# Patient Record
Sex: MALE | Race: WHITE | NOT HISPANIC OR LATINO | Employment: FULL TIME | URBAN - METROPOLITAN AREA
[De-identification: names, ages, dates, MRNs, and addresses within clinical notes are randomized per-mention and may not be internally consistent; named-entity substitution may affect disease eponyms.]

---

## 2018-10-29 ENCOUNTER — OFFICE VISIT (OUTPATIENT)
Dept: SURGERY | Facility: CLINIC | Age: 18
End: 2018-10-29

## 2018-10-29 VITALS — HEART RATE: 73 BPM | OXYGEN SATURATION: 98 % | BODY MASS INDEX: 21.8 KG/M2 | HEIGHT: 69 IN | WEIGHT: 147.2 LBS

## 2018-10-29 DIAGNOSIS — R07.89 ANTERIOR CHEST WALL PAIN: Primary | ICD-10-CM

## 2018-10-29 PROCEDURE — 99244 OFF/OP CNSLTJ NEW/EST MOD 40: CPT | Performed by: SURGERY

## 2018-10-29 RX ORDER — DEXTROAMPHETAMINE SACCHARATE, AMPHETAMINE ASPARTATE, DEXTROAMPHETAMINE SULFATE AND AMPHETAMINE SULFATE 1.25; 1.25; 1.25; 1.25 MG/1; MG/1; MG/1; MG/1
1 TABLET ORAL DAILY
COMMUNITY
Start: 2018-10-09

## 2018-10-29 RX ORDER — METHOCARBAMOL 500 MG/1
1 TABLET, FILM COATED ORAL NIGHTLY
COMMUNITY
Start: 2018-10-26

## 2018-10-29 RX ORDER — IBUPROFEN 800 MG/1
1 TABLET ORAL NIGHTLY
COMMUNITY
Start: 2018-10-26

## 2018-10-29 RX ORDER — DEXTROAMPHETAMINE SACCHARATE, AMPHETAMINE ASPARTATE MONOHYDRATE, DEXTROAMPHETAMINE SULFATE AND AMPHETAMINE SULFATE 5; 5; 5; 5 MG/1; MG/1; MG/1; MG/1
1 CAPSULE, EXTENDED RELEASE ORAL DAILY
COMMUNITY
Start: 2018-10-09

## 2018-10-29 RX ORDER — FLUOXETINE 10 MG/1
10 CAPSULE ORAL DAILY
COMMUNITY

## 2018-10-29 RX ORDER — TRAMADOL HYDROCHLORIDE 50 MG/1
1 TABLET ORAL NIGHTLY
COMMUNITY
Start: 2018-10-26

## 2018-10-29 NOTE — PROGRESS NOTES
SUMMARY (A/P):    18-year-old gentleman with right chest wall pain at the clavicular sternal junction of uncertain etiology without definitive diagnostic findings on exam.  I have requested and I'm waiting for the results of his CT scan.  He also has an MRI scheduled for today.  I will review these when they become available and further recommendations will follow.      CC:    Anterior chest wall pain, referred for consultation by Dr. Godoy    HPI:    18-year-old gentleman with 3-4 week history of moderately severe intermittent pain at the right clavicular sternal junction.  His pain improved a little with use of tramadol.  There are no other associated symptoms such as fever or chills.  He has not had this type of pain before.    PSH:    None    PMH:    ADD (on Adderall)    FAMILY HISTORY:    Negative for colorectal cancer    SOCIAL HISTORY:   Denies tobacco use  Denies alcohol use    ALLERGIES: reviewed, in Epic    MEDICATIONS: reviewed, in Epic    ROS:  No chest pain or shortness of air.  All other systems reviewed and negative other than presenting complaints.    PHYSICAL EXAM:   Constitutional: Well-developed well-nourished, no acute distress  Vital signs: Heart rate 73, weight 147 pounds, height 69 inches, BMI 21.7  Eyes: Conjunctiva normal, sclera nonicteric  ENMT: Hearing grossly normal, oral mucosa moist  Neck: Supple, no palpable mass, normal thyroid, trachea midline  Respiratory: Clear to auscultation, normal inspiratory effort  Cardiovascular: Regular rate, no murmur, no carotid bruit, no peripheral edema, no jugular venous distention  Gastrointestinal: Soft, nontender, no palpable mass, no hepatosplenomegaly, negative for hernia, bowel sounds normal  Lymphatics (palpable nodes):  cervical-negative, axillary-negative, inguinal-negative  Skin:  Warm, dry.  He is mildly to moderately tender to palpation over the right clavicular sternal junction region.  There does appear to be some soft tissue  "\"fullness\", but I cannot palpate a discrete mass.  There is no erythema, no crepitance, no fluctuance.  Musculoskeletal: Symmetric strength, normal gait  Psychiatric: Alert and oriented ×3, normal affect     LORRAINE JAIN M.D.      "

## 2018-10-31 ENCOUNTER — TRANSCRIBE ORDERS (OUTPATIENT)
Dept: SURGERY | Facility: CLINIC | Age: 18
End: 2018-10-31

## 2018-10-31 ENCOUNTER — TELEPHONE (OUTPATIENT)
Dept: SURGERY | Facility: CLINIC | Age: 18
End: 2018-10-31

## 2018-10-31 DIAGNOSIS — IMO0002 MASS: Primary | ICD-10-CM

## 2018-10-31 DIAGNOSIS — M89.8X8 STERNAL MASS: ICD-10-CM

## 2018-10-31 RX ORDER — AMOXICILLIN AND CLAVULANATE POTASSIUM 875; 125 MG/1; MG/1
1 TABLET, FILM COATED ORAL 2 TIMES DAILY
Qty: 20 TABLET | Refills: 0 | Status: SHIPPED | OUTPATIENT
Start: 2018-10-31 | End: 2018-11-10

## 2018-10-31 NOTE — TELEPHONE ENCOUNTER
I spoke w/ the patient's mother informed her of Dr. Garrido's response. RX called into pharmacy, referral entered also.

## 2018-10-31 NOTE — TELEPHONE ENCOUNTER
I sent the following message (I believed to Sera):    Please let him know that his MRI shows that this appears to be a problem in the clavicular sternal joint space.  It may be infectious but this is not clear from the MRI.  Please:  1.  Call in prescription for Augmentin 875 mg by mouth twice a day, #20  2.  Have him see one of the orthopedic surgeons (either [facility] or [facility])    So yes, I reviewed the images and those are my recommendations.

## 2018-10-31 NOTE — TELEPHONE ENCOUNTER
Per Rima, referral coordinator with [facility], this referral should go to thoracic surgery. Please advise.

## 2018-11-07 ENCOUNTER — OFFICE VISIT (OUTPATIENT)
Dept: ORTHOPEDIC SURGERY | Facility: CLINIC | Age: 18
End: 2018-11-07

## 2018-11-07 VITALS — WEIGHT: 146 LBS | BODY MASS INDEX: 21.62 KG/M2 | TEMPERATURE: 100 F | HEIGHT: 69 IN

## 2018-11-07 DIAGNOSIS — M25.811 MASS OF JOINT OF RIGHT SHOULDER: Primary | ICD-10-CM

## 2018-11-07 PROCEDURE — 20605 DRAIN/INJ JOINT/BURSA W/O US: CPT | Performed by: ORTHOPAEDIC SURGERY

## 2018-11-07 PROCEDURE — 99203 OFFICE O/P NEW LOW 30 MIN: CPT | Performed by: ORTHOPAEDIC SURGERY

## 2018-11-07 RX ORDER — HYDROCODONE BITARTRATE AND ACETAMINOPHEN 5; 325 MG/1; MG/1
1 TABLET ORAL EVERY 4 HOURS PRN
Qty: 20 TABLET | Refills: 0 | Status: SHIPPED | OUTPATIENT
Start: 2018-11-07

## 2018-11-07 NOTE — PROGRESS NOTES
"  Patient: González Altman    YOB: 2000    Medical Record Number: [id number]    Chief Complaints:  Painful right shoulder mass    History of Present Illness:     18 y.o. male patient who presents for evaluation of his right shoulder.  He tells me that he first started to have symptoms little over a month ago.  He does not recall any specific inciting event or factor.  The pain came on suddenly overnight.  He describes his current pain as mild to moderate, constant and aching.  The pain is worse with shoulder movement.  He has noticed some associated swelling in the front of his chest on the right side.  He tells me that the swelling \"comes and goes\".  He has pain when sleeping on his right side or trying to reach or lift.  Localizes the pain to the sternoclavicular joint.  Denies any fevers, chills, sweats or other constitutional symptoms.  Denies any significant medical history.  Denies any history of IV drug use.  He is active and enjoys working out.    Allergies: No Known Allergies    Home Medications:      Current Outpatient Prescriptions:   •  amoxicillin-clavulanate (AUGMENTIN) 875-125 MG per tablet, Take 1 tablet by mouth 2 (Two) Times a Day for 10 days., Disp: 20 tablet, Rfl: 0  •  amphetamine-dextroamphetamine (ADDERALL) 5 MG tablet, Take 1 tablet by mouth Daily., Disp: , Rfl:   •  amphetamine-dextroamphetamine XR (ADDERALL XR) 20 MG 24 hr capsule, Take 1 capsule by mouth Daily, Disp: , Rfl:   •  FLUoxetine (PROzac) 10 MG capsule, Take 10 mg by mouth Daily., Disp: , Rfl:   •  ibuprofen (ADVIL,MOTRIN) 800 MG tablet, Take 1 tablet by mouth Every Night., Disp: , Rfl:   •  methocarbamol (ROBAXIN) 500 MG tablet, Take 1 tablet by mouth Every Night., Disp: , Rfl:   •  traMADol (ULTRAM) 50 MG tablet, Take 1 tablet by mouth Every Night., Disp: , Rfl:   •  HYDROcodone-acetaminophen (NORCO) 5-325 MG per tablet, Take 1 tablet by mouth Every 4 (Four) Hours As Needed for Moderate Pain ., Disp: 20 tablet, " "Rfl: 0    Past Medical History:   Diagnosis Date   • Anxiety        History reviewed. No pertinent surgical history.    Social History     Occupational History   • Not on file.     Social History Main Topics   • Smoking status: Not on file   • Smokeless tobacco: Never Used   • Alcohol use No   • Drug use: No   • Sexual activity: Defer      Social History     Social History Narrative   • No narrative on file       History reviewed. No pertinent family history.    Review of Systems:      Constitutional: Denies fever, shaking or chills   Eyes: Denies change in visual acuity   HEENT: Denies nasal congestion or sore throat   Respiratory: Denies cough or shortness of breath   Cardiovascular: Denies chest pain or edema  Endocrine: Denies tremors, palpitations, intolerance of heat or cold, polyuria, polydipsia.  GI: Denies abdominal pain, nausea, vomiting, bloody stools or diarrhea  : Denies frequency, urgency, incontinence, retention, or nocturia.  Musculoskeletal: Denies numbness, tingling or loss of motor function except as above  Integument: Denies rash, lesion or ulceration   Neurologic: Denies headache or focal weakness, deficits  Heme: Denies spontaneous or excessive bleeding, epistaxis, hematuria, melena, fatigue, enlarged or tender lymph nodes.      All other pertinent positives and negatives as noted above in HPI.    Physical Exam: 18 y.o. male    Vitals:    11/07/18 1151   Temp: 100 °F (37.8 °C)   TempSrc: Temporal Artery    Weight: 66.2 kg (146 lb)   Height: 175.3 cm (69\")       General:  Patient is awake and alert.  Appears in no acute distress or discomfort.    Psych:  Affect and demeanor are appropriate.    Eyes:  Conjunctiva and sclera appear grossly normal.  Eyes track well and EOM seem to be intact.    Ears:  No gross abnormalities.  Hearing adequate for the exam.    Cardiovascular:  Regular rate and rhythm.    Lungs:  Good chest expansion.  Breathing unlabored.    Lymph:  No palpable adenopathy in the " right upper extremity    Extremities:  The right shoulder is examined.  Skin appears benign without any obvious abnormalities on initial inspection.  There is slight prominence of the right sternoclavicular joint as compared to the left.  No overlying erythema.  He is tender diffusely over the sternoclavicular joint and he has a palpable cystic mass in this area.  He has good shoulder motion.  Cross body adduction is uncomfortable for him.  Good strength with abduction and elevation.  Normal motor and sensory function throughout the right arm and hand.  Palpable radial pulse.  Brisk capillary refill.         Radiology:   Multiple outside imaging studies are brought in by the patient and reviewed.  These include CT scan of the chest and an MRI of the neck.  The scans confirm a noncystic enhancing distention of the sternoclavicular synovium.  Differential diagnosis includes septic arthritis and synovial sarcoma.    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  I explained that my impression is that this is probably either a ganglion cyst or more likely a chronic septic arthritis, given the low-grade fever.  He is not septic but we need to try to get him taken care of expeditiously.  I recommended an aspiration.  Under sterile conditions and without complication I attempted an aspirationt.  I was unable to get any fluid from the cyst.  Admittedly, this procedure was performed very cautiously due to the nearby vascular structures.  In the office today I did not feel comfortable more aggressively manipulating the needle to try to get fluid.  I think that he is probably best served with an open irrigation and debridement with biopsy.  Given the possibility of synovial sarcoma, I think it prudent that we send him to an orthopedic oncologist for this.  I will make the necessary  arrangements and we will be in touch with him shortly.    Jarred Freire MD    11/07/2018    Medium Joint Arthrocentesis  Date/Time: 11/7/2018 12:10 PM  Consent given by: patient  Site marked: site marked  Timeout: Immediately prior to procedure a time out was called to verify the correct patient, procedure, equipment, support staff and site/side marked as required   Supporting Documentation  Indications: pain   Procedure Details  Location: shoulder (Right shoulder sternoclavicular joint) -   Preparation: Patient was prepped and draped in the usual sterile fashion  Needle size: 25 G  Approach: anterolateral (anterior )  Medication group details: 4 cc Lidiocaine PF 2%  [id number] exp 06/30/21   Patient tolerance: patient tolerated the procedure well with no immediate complications

## 2018-11-09 ENCOUNTER — TELEPHONE (OUTPATIENT)
Dept: ORTHOPEDIC SURGERY | Facility: CLINIC | Age: 18
End: 2018-11-09

## 2018-11-14 ENCOUNTER — TELEPHONE (OUTPATIENT)
Dept: ORTHOPEDIC SURGERY | Facility: CLINIC | Age: 18
End: 2018-11-14

## 2019-02-13 ENCOUNTER — OFFICE VISIT CONVERTED (OUTPATIENT)
Dept: INTERNAL MEDICINE | Facility: CLINIC | Age: 19
End: 2019-02-13
Attending: NURSE PRACTITIONER

## 2021-05-15 VITALS
TEMPERATURE: 97.8 F | WEIGHT: 145.5 LBS | BODY MASS INDEX: 21.55 KG/M2 | SYSTOLIC BLOOD PRESSURE: 122 MMHG | RESPIRATION RATE: 16 BRPM | HEART RATE: 66 BPM | HEIGHT: 69 IN | OXYGEN SATURATION: 100 % | DIASTOLIC BLOOD PRESSURE: 82 MMHG